# Patient Record
Sex: FEMALE | Race: WHITE | NOT HISPANIC OR LATINO | Employment: FULL TIME | URBAN - METROPOLITAN AREA
[De-identification: names, ages, dates, MRNs, and addresses within clinical notes are randomized per-mention and may not be internally consistent; named-entity substitution may affect disease eponyms.]

---

## 2022-11-05 ENCOUNTER — TELEPHONE (OUTPATIENT)
Dept: SCHEDULING | Facility: IMAGING CENTER | Age: 32
End: 2022-11-05

## 2023-03-07 ENCOUNTER — OFFICE VISIT (OUTPATIENT)
Dept: MEDICAL GROUP | Facility: MEDICAL CENTER | Age: 33
End: 2023-03-07
Payer: COMMERCIAL

## 2023-03-07 VITALS
HEART RATE: 76 BPM | RESPIRATION RATE: 16 BRPM | DIASTOLIC BLOOD PRESSURE: 70 MMHG | BODY MASS INDEX: 25.76 KG/M2 | SYSTOLIC BLOOD PRESSURE: 98 MMHG | TEMPERATURE: 98.6 F | WEIGHT: 170 LBS | HEIGHT: 68 IN | OXYGEN SATURATION: 97 %

## 2023-03-07 DIAGNOSIS — Z00.00 WELLNESS EXAMINATION: ICD-10-CM

## 2023-03-07 DIAGNOSIS — Z11.3 SCREENING EXAMINATION FOR SEXUALLY TRANSMITTED DISEASE: ICD-10-CM

## 2023-03-07 DIAGNOSIS — N92.6 MISSED PERIOD: ICD-10-CM

## 2023-03-07 DIAGNOSIS — F41.9 ANXIETY: ICD-10-CM

## 2023-03-07 PROCEDURE — 99203 OFFICE O/P NEW LOW 30 MIN: CPT | Performed by: BEHAVIOR ANALYST

## 2023-03-07 ASSESSMENT — ENCOUNTER SYMPTOMS
VOMITING: 0
NAUSEA: 0
SHORTNESS OF BREATH: 0
DIZZINESS: 0

## 2023-03-07 ASSESSMENT — PATIENT HEALTH QUESTIONNAIRE - PHQ9: CLINICAL INTERPRETATION OF PHQ2 SCORE: 0

## 2023-03-07 NOTE — PROGRESS NOTES
Subjective:     CC:    Chief Complaint   Patient presents with    Establish Care     Possible Pregnancy     Requesting Labs          HISTORY OF THE PRESENT ILLNESS: Patient is a 32 y.o. female. This pleasant patient is here today to establish care and discuss positive pregnancy test. His/her prior PCP was none.    Problem   Missed Period    LMP 2/1/2023.   She is 5 weeks pregnant today. 3 positive at home pregnancy tests.  Taking prenatals, omega, and calcium and zinc and takes 300mg magnesium in the evenings.  Probiotics as well.   She does not yet have a obgyn.        Anxiety    She had been using 300 mg gabapentin as needed for anxiety and PMS associated low mood.  The gabapentin was prescribed while she had spent time in [place].  She said this has been very helpful.         Current Outpatient Medications   Medication Sig    Non Formulary Request Pt. Is also taking Birth Control  (Patient not taking: Reported on 3/7/2023)        Social History     Socioeconomic History    Marital status:    Occupational History    Occupation:    Tobacco Use    Smoking status: Never     Passive exposure: Yes    Smokeless tobacco: Never   Vaping Use    Vaping Use: Former   Substance and Sexual Activity    Alcohol use: Not Currently     Alcohol/week: 1.2 oz     Types: 1 Glasses of wine, 1 Shots of liquor per week    Drug use: No    Sexual activity: Yes     Partners: Male       Family History   Problem Relation Age of Onset    Hypertension Father     Breast Cancer Maternal Grandmother     Cancer Maternal Grandmother         breast    Heart Disease Paternal Grandfather     Colorectal Cancer Neg Hx     Ovarian Cancer Neg Hx        Health Maintenance: Completed    ROS: See HPI  Review of Systems   Respiratory:  Negative for shortness of breath.    Cardiovascular:  Negative for chest pain.   Gastrointestinal:  Negative for nausea and vomiting.   Genitourinary:  Negative for dysuria and urgency.   Neurological:   "Negative for dizziness.       Objective:     Exam: BP 98/70 (BP Location: Right arm, Patient Position: Sitting, BP Cuff Size: Adult long)   Pulse 76   Temp 37 °C (98.6 °F) (Temporal)   Resp 16   Ht 1.727 m (5' 8\")   Wt 77.1 kg (170 lb)   LMP 2023   SpO2 97%   BMI 25.85 kg/m²   Body mass index is 25.85 kg/m².    Physical Exam  Constitutional:       Appearance: Normal appearance.   HENT:      Right Ear: Tympanic membrane normal.      Left Ear: Tympanic membrane normal.      Nose: Nose normal.      Mouth/Throat:      Mouth: Mucous membranes are moist.      Pharynx: Oropharynx is clear.   Eyes:      Extraocular Movements: Extraocular movements intact.      Pupils: Pupils are equal, round, and reactive to light.   Cardiovascular:      Rate and Rhythm: Normal rate and regular rhythm.      Pulses: Normal pulses.      Heart sounds: Normal heart sounds.   Pulmonary:      Effort: Pulmonary effort is normal.      Breath sounds: Normal breath sounds.   Musculoskeletal:      Cervical back: Normal range of motion and neck supple.   Neurological:      Mental Status: She is alert.              Assessment & Plan:     32 y.o. female with the following -     1. Missed period  -Currently 5 weeks pregnant.  Offered to complete in office pregnancy test.  Patient declines as she is unsure what her insurance covers.  -Discussed  nutrition, physical activity, work, and common pregnancy complaints, such as morning sickness, heartburn, and backache.  - Counseled on avoiding alcohol, smoking, drugs and chemicals.  - Provided information on local OB offices accepting new patients.  She is going to do some research and check with her insurance.  She was informed that her first ultrasound should be between 8 to 12 weeks gestation.     2. Wellness examination  - CBC WITH DIFFERENTIAL; Future  - Lipid Profile; Future  - TSH WITH REFLEX TO FT4; Future  - Comp Metabolic Panel; Future    3. Screening examination for sexually " transmitted disease  - HIV AG/AB Combo Assay Screening; Future  - T.Pallidum AB JASON (Screening); Future  - Trichomonas Vaginalis by TMA  - Hepatitis C Virus Antibody; Future  - HEP B Surface Antibody; Future  - Hep B Core AB Total; Future  - Hep B Surface Antigen; Future    4. Anxiety  -Advised against using gabapentin during pregnancy.  -Continue as needed magnesium supplement in the evening to help with sleep and relaxation.  Do not exceed 500 mg of magnesium through prenatal vitamin and magnesium supplement combined.      Return in about 1 year (around 3/7/2024) for Annual preventative.    Please note that this dictation was created using voice recognition software. I have made every reasonable attempt to correct obvious errors, but I expect that there are errors of grammar and possibly content that I did not discover before finalizing the note.

## 2023-03-21 ENCOUNTER — APPOINTMENT (OUTPATIENT)
Dept: LAB | Facility: MEDICAL CENTER | Age: 33
End: 2023-03-21
Payer: COMMERCIAL

## 2024-07-07 ENCOUNTER — PHARMACY VISIT (OUTPATIENT)
Dept: PHARMACY | Facility: MEDICAL CENTER | Age: 34
End: 2024-07-07
Payer: COMMERCIAL

## 2024-07-07 PROCEDURE — RXMED WILLOW AMBULATORY MEDICATION CHARGE

## 2024-07-07 RX ORDER — SULFAMETHOXAZOLE AND TRIMETHOPRIM 800; 160 MG/1; MG/1
1 TABLET ORAL 2 TIMES DAILY
Qty: 10 TABLET | Refills: 0 | OUTPATIENT
Start: 2024-07-07